# Patient Record
Sex: FEMALE | Race: WHITE | Employment: OTHER | URBAN - METROPOLITAN AREA
[De-identification: names, ages, dates, MRNs, and addresses within clinical notes are randomized per-mention and may not be internally consistent; named-entity substitution may affect disease eponyms.]

---

## 2017-01-01 ENCOUNTER — HOSPITAL ENCOUNTER (INPATIENT)
Facility: HOSPITAL | Age: 82
LOS: 3 days | Discharge: HOSPICE | DRG: 291 | End: 2017-01-01
Attending: EMERGENCY MEDICINE | Admitting: INTERNAL MEDICINE
Payer: MEDICARE

## 2017-01-01 ENCOUNTER — LAB REQUISITION (OUTPATIENT)
Dept: LAB | Facility: HOSPITAL | Age: 82
End: 2017-01-01
Payer: MEDICARE

## 2017-01-01 ENCOUNTER — HOSPITAL ENCOUNTER (INPATIENT)
Facility: HOSPITAL | Age: 82
LOS: 6 days | Discharge: NURSING, REHAB OR LONG-TERM CARE | DRG: 286 | End: 2017-01-01
Attending: EMERGENCY MEDICINE | Admitting: INTERNAL MEDICINE
Payer: MEDICARE

## 2017-01-01 ENCOUNTER — APPOINTMENT (OUTPATIENT)
Dept: CV DIAGNOSTICS | Facility: HOSPITAL | Age: 82
DRG: 286 | End: 2017-01-01
Attending: INTERNAL MEDICINE
Payer: MEDICARE

## 2017-01-01 ENCOUNTER — LAB REQUISITION (OUTPATIENT)
Dept: LAB | Facility: HOSPITAL | Age: 82
DRG: 291 | End: 2017-01-01
Payer: MEDICARE

## 2017-01-01 ENCOUNTER — APPOINTMENT (OUTPATIENT)
Dept: GENERAL RADIOLOGY | Facility: HOSPITAL | Age: 82
DRG: 291 | End: 2017-01-01
Attending: INTERNAL MEDICINE
Payer: MEDICARE

## 2017-01-01 ENCOUNTER — APPOINTMENT (OUTPATIENT)
Dept: INTERVENTIONAL RADIOLOGY/VASCULAR | Facility: HOSPITAL | Age: 82
DRG: 286 | End: 2017-01-01
Attending: NURSE PRACTITIONER
Payer: MEDICARE

## 2017-01-01 ENCOUNTER — APPOINTMENT (OUTPATIENT)
Dept: GENERAL RADIOLOGY | Facility: HOSPITAL | Age: 82
End: 2017-01-01
Attending: EMERGENCY MEDICINE
Payer: MEDICARE

## 2017-01-01 ENCOUNTER — OFFICE VISIT (OUTPATIENT)
Dept: CARDIOLOGY CLINIC | Facility: HOSPITAL | Age: 82
End: 2017-01-01
Attending: INTERNAL MEDICINE
Payer: MEDICARE

## 2017-01-01 ENCOUNTER — APPOINTMENT (OUTPATIENT)
Dept: ULTRASOUND IMAGING | Facility: HOSPITAL | Age: 82
DRG: 291 | End: 2017-01-01
Attending: INTERNAL MEDICINE
Payer: MEDICARE

## 2017-01-01 ENCOUNTER — APPOINTMENT (OUTPATIENT)
Dept: CT IMAGING | Facility: HOSPITAL | Age: 82
DRG: 286 | End: 2017-01-01
Attending: INTERNAL MEDICINE
Payer: MEDICARE

## 2017-01-01 ENCOUNTER — APPOINTMENT (OUTPATIENT)
Dept: GENERAL RADIOLOGY | Facility: HOSPITAL | Age: 82
DRG: 286 | End: 2017-01-01
Attending: EMERGENCY MEDICINE
Payer: MEDICARE

## 2017-01-01 ENCOUNTER — HOSPITAL ENCOUNTER (EMERGENCY)
Facility: HOSPITAL | Age: 82
Discharge: HOME | End: 2017-01-01
Attending: EMERGENCY MEDICINE
Payer: MEDICARE

## 2017-01-01 ENCOUNTER — APPOINTMENT (OUTPATIENT)
Dept: GENERAL RADIOLOGY | Facility: HOSPITAL | Age: 82
DRG: 291 | End: 2017-01-01
Attending: EMERGENCY MEDICINE
Payer: MEDICARE

## 2017-01-01 VITALS
WEIGHT: 86.19 LBS | RESPIRATION RATE: 16 BRPM | SYSTOLIC BLOOD PRESSURE: 102 MMHG | HEIGHT: 60 IN | DIASTOLIC BLOOD PRESSURE: 51 MMHG | TEMPERATURE: 98 F | OXYGEN SATURATION: 94 % | HEART RATE: 100 BPM | BODY MASS INDEX: 16.92 KG/M2

## 2017-01-01 VITALS
OXYGEN SATURATION: 97 % | HEIGHT: 64 IN | HEART RATE: 81 BPM | SYSTOLIC BLOOD PRESSURE: 99 MMHG | TEMPERATURE: 98 F | BODY MASS INDEX: 20.49 KG/M2 | DIASTOLIC BLOOD PRESSURE: 63 MMHG | RESPIRATION RATE: 18 BRPM | WEIGHT: 120 LBS

## 2017-01-01 VITALS
SYSTOLIC BLOOD PRESSURE: 125 MMHG | RESPIRATION RATE: 18 BRPM | OXYGEN SATURATION: 91 % | WEIGHT: 92 LBS | HEART RATE: 91 BPM | DIASTOLIC BLOOD PRESSURE: 72 MMHG | BODY MASS INDEX: 18 KG/M2

## 2017-01-01 VITALS
OXYGEN SATURATION: 98 % | BODY MASS INDEX: 21 KG/M2 | RESPIRATION RATE: 20 BRPM | WEIGHT: 107.63 LBS | SYSTOLIC BLOOD PRESSURE: 98 MMHG | TEMPERATURE: 97 F | HEART RATE: 98 BPM | DIASTOLIC BLOOD PRESSURE: 53 MMHG

## 2017-01-01 DIAGNOSIS — I50.9 HEART FAILURE (HCC): ICD-10-CM

## 2017-01-01 DIAGNOSIS — R79.89 OTHER SPECIFIED ABNORMAL FINDINGS OF BLOOD CHEMISTRY: ICD-10-CM

## 2017-01-01 DIAGNOSIS — I50.9 CONGESTIVE HEART FAILURE WITH CARDIOMYOPATHY AND CARDIOMEGALY (HCC): Primary | ICD-10-CM

## 2017-01-01 DIAGNOSIS — N39.0 URINARY TRACT INFECTION: ICD-10-CM

## 2017-01-01 DIAGNOSIS — I50.20 SYSTOLIC CONGESTIVE HEART FAILURE (HCC): ICD-10-CM

## 2017-01-01 DIAGNOSIS — I42.9 CONGESTIVE HEART FAILURE WITH CARDIOMYOPATHY AND CARDIOMEGALY (HCC): Primary | ICD-10-CM

## 2017-01-01 DIAGNOSIS — I35.0 NONRHEUMATIC AORTIC VALVE STENOSIS: ICD-10-CM

## 2017-01-01 DIAGNOSIS — I51.7 CONGESTIVE HEART FAILURE WITH CARDIOMYOPATHY AND CARDIOMEGALY (HCC): Primary | ICD-10-CM

## 2017-01-01 DIAGNOSIS — R60.9 EDEMA: ICD-10-CM

## 2017-01-01 DIAGNOSIS — S52.532A CLOSED COLLES' FRACTURE OF LEFT RADIUS, INITIAL ENCOUNTER: Primary | ICD-10-CM

## 2017-01-01 DIAGNOSIS — S52.615A CLOSED NONDISPLACED FRACTURE OF STYLOID PROCESS OF LEFT ULNA, INITIAL ENCOUNTER: ICD-10-CM

## 2017-01-01 DIAGNOSIS — I42.9 CARDIOMYOPATHY (HCC): ICD-10-CM

## 2017-01-01 DIAGNOSIS — I50.23 ACUTE ON CHRONIC SYSTOLIC (CONGESTIVE) HEART FAILURE (HCC): Primary | ICD-10-CM

## 2017-01-01 DIAGNOSIS — E88.9 METABOLIC DISORDER: ICD-10-CM

## 2017-01-01 DIAGNOSIS — I50.9 ACUTE ON CHRONIC CONGESTIVE HEART FAILURE, UNSPECIFIED CONGESTIVE HEART FAILURE TYPE: Primary | ICD-10-CM

## 2017-01-01 LAB
ANION GAP SERPL CALC-SCNC: 10 MMOL/L (ref 0–18)
ANION GAP SERPL CALC-SCNC: 10 MMOL/L (ref 0–18)
ANION GAP SERPL CALC-SCNC: 11 MMOL/L (ref 0–18)
ANION GAP SERPL CALC-SCNC: 11 MMOL/L (ref 0–18)
ANION GAP SERPL CALC-SCNC: 12 MMOL/L (ref 0–18)
ANION GAP SERPL CALC-SCNC: 7 MMOL/L (ref 0–18)
ANION GAP SERPL CALC-SCNC: 7 MMOL/L (ref 0–18)
ANION GAP SERPL CALC-SCNC: 8 MMOL/L (ref 0–18)
ANION GAP SERPL CALC-SCNC: 9 MMOL/L (ref 0–18)
ANION GAP SERPL CALC-SCNC: 9 MMOL/L (ref 0–18)
BASOPHILS # BLD: 0 K/UL (ref 0–0.2)
BASOPHILS # BLD: 0.1 K/UL (ref 0–0.2)
BASOPHILS NFR BLD: 1 %
BASOPHILS NFR BLD: 1 %
BNP SERPL-MCNC: 4951 PG/ML (ref 0–100)
BNP SERPL-MCNC: 5388 PG/ML (ref 0–100)
BUN SERPL-MCNC: 20 MG/DL (ref 8–20)
BUN SERPL-MCNC: 24 MG/DL (ref 8–20)
BUN SERPL-MCNC: 25 MG/DL (ref 8–20)
BUN SERPL-MCNC: 27 MG/DL (ref 8–20)
BUN SERPL-MCNC: 28 MG/DL (ref 8–20)
BUN SERPL-MCNC: 29 MG/DL (ref 8–20)
BUN SERPL-MCNC: 31 MG/DL (ref 8–20)
BUN SERPL-MCNC: 33 MG/DL (ref 8–20)
BUN SERPL-MCNC: 38 MG/DL (ref 8–20)
BUN SERPL-MCNC: 41 MG/DL (ref 8–20)
BUN/CREAT SERPL: 22 (ref 10–20)
BUN/CREAT SERPL: 25.8 (ref 10–20)
BUN/CREAT SERPL: 27 (ref 10–20)
BUN/CREAT SERPL: 27 (ref 10–20)
BUN/CREAT SERPL: 30.8 (ref 10–20)
BUN/CREAT SERPL: 32.5 (ref 10–20)
BUN/CREAT SERPL: 32.6 (ref 10–20)
BUN/CREAT SERPL: 34.8 (ref 10–20)
BUN/CREAT SERPL: 40.4 (ref 10–20)
BUN/CREAT SERPL: 41 (ref 10–20)
CALCIUM SERPL-MCNC: 8.4 MG/DL (ref 8.5–10.5)
CALCIUM SERPL-MCNC: 8.5 MG/DL (ref 8.5–10.5)
CALCIUM SERPL-MCNC: 8.5 MG/DL (ref 8.5–10.5)
CALCIUM SERPL-MCNC: 8.6 MG/DL (ref 8.5–10.5)
CALCIUM SERPL-MCNC: 8.9 MG/DL (ref 8.5–10.5)
CALCIUM SERPL-MCNC: 9 MG/DL (ref 8.5–10.5)
CALCIUM SERPL-MCNC: 9.1 MG/DL (ref 8.5–10.5)
CALCIUM SERPL-MCNC: 9.1 MG/DL (ref 8.5–10.5)
CALCIUM SERPL-MCNC: 9.2 MG/DL (ref 8.5–10.5)
CALCIUM SERPL-MCNC: 9.2 MG/DL (ref 8.5–10.5)
CHLORIDE SERPL-SCNC: 101 MMOL/L (ref 95–110)
CHLORIDE SERPL-SCNC: 101 MMOL/L (ref 95–110)
CHLORIDE SERPL-SCNC: 102 MMOL/L (ref 95–110)
CHLORIDE SERPL-SCNC: 102 MMOL/L (ref 95–110)
CHLORIDE SERPL-SCNC: 103 MMOL/L (ref 95–110)
CHLORIDE SERPL-SCNC: 105 MMOL/L (ref 95–110)
CHLORIDE SERPL-SCNC: 105 MMOL/L (ref 95–110)
CHLORIDE SERPL-SCNC: 94 MMOL/L (ref 95–110)
CHLORIDE SERPL-SCNC: 97 MMOL/L (ref 95–110)
CHLORIDE SERPL-SCNC: 99 MMOL/L (ref 95–110)
CHOLEST SERPL-MCNC: 177 MG/DL (ref 110–200)
CO2 SERPL-SCNC: 24 MMOL/L (ref 22–32)
CO2 SERPL-SCNC: 28 MMOL/L (ref 22–32)
CO2 SERPL-SCNC: 29 MMOL/L (ref 22–32)
CO2 SERPL-SCNC: 31 MMOL/L (ref 22–32)
CO2 SERPL-SCNC: 31 MMOL/L (ref 22–32)
CO2 SERPL-SCNC: 32 MMOL/L (ref 22–32)
CO2 SERPL-SCNC: 33 MMOL/L (ref 22–32)
CO2 SERPL-SCNC: 39 MMOL/L (ref 22–32)
CREAT SERPL-MCNC: 0.74 MG/DL (ref 0.5–1.5)
CREAT SERPL-MCNC: 0.78 MG/DL (ref 0.5–1.5)
CREAT SERPL-MCNC: 0.83 MG/DL (ref 0.5–1.5)
CREAT SERPL-MCNC: 0.89 MG/DL (ref 0.5–1.5)
CREAT SERPL-MCNC: 0.89 MG/DL (ref 0.5–1.5)
CREAT SERPL-MCNC: 0.94 MG/DL (ref 0.5–1.5)
CREAT SERPL-MCNC: 0.97 MG/DL (ref 0.5–1.5)
CREAT SERPL-MCNC: 1 MG/DL (ref 0.5–1.5)
CREAT SERPL-MCNC: 1.22 MG/DL (ref 0.5–1.5)
CREAT SERPL-MCNC: 1.27 MG/DL (ref 0.5–1.5)
EOSINOPHIL # BLD: 0.3 K/UL (ref 0–0.7)
EOSINOPHIL # BLD: 0.5 K/UL (ref 0–0.7)
EOSINOPHIL NFR BLD: 4 %
EOSINOPHIL NFR BLD: 6 %
ERYTHROCYTE [DISTWIDTH] IN BLOOD BY AUTOMATED COUNT: 14.3 % (ref 11–15)
ERYTHROCYTE [DISTWIDTH] IN BLOOD BY AUTOMATED COUNT: 16.8 % (ref 11–15)
GLUCOSE SERPL-MCNC: 104 MG/DL (ref 70–99)
GLUCOSE SERPL-MCNC: 107 MG/DL (ref 70–99)
GLUCOSE SERPL-MCNC: 111 MG/DL (ref 70–99)
GLUCOSE SERPL-MCNC: 111 MG/DL (ref 70–99)
GLUCOSE SERPL-MCNC: 114 MG/DL (ref 70–99)
GLUCOSE SERPL-MCNC: 123 MG/DL (ref 70–99)
GLUCOSE SERPL-MCNC: 125 MG/DL (ref 70–99)
GLUCOSE SERPL-MCNC: 133 MG/DL (ref 70–99)
GLUCOSE SERPL-MCNC: 139 MG/DL (ref 70–99)
GLUCOSE SERPL-MCNC: 98 MG/DL (ref 70–99)
HCT VFR BLD AUTO: 36.5 % (ref 35–48)
HCT VFR BLD AUTO: 42.8 % (ref 35–48)
HDLC SERPL-MCNC: 46 MG/DL
HGB BLD-MCNC: 12.2 G/DL (ref 12–16)
HGB BLD-MCNC: 13.9 G/DL (ref 12–16)
LDLC SERPL CALC-MCNC: 109 MG/DL (ref 0–99)
LYMPHOCYTES # BLD: 1.3 K/UL (ref 1–4)
LYMPHOCYTES # BLD: 1.5 K/UL (ref 1–4)
LYMPHOCYTES NFR BLD: 16 %
LYMPHOCYTES NFR BLD: 20 %
MCH RBC QN AUTO: 28.9 PG (ref 27–32)
MCH RBC QN AUTO: 29.5 PG (ref 27–32)
MCHC RBC AUTO-ENTMCNC: 32.3 G/DL (ref 32–37)
MCHC RBC AUTO-ENTMCNC: 33.5 G/DL (ref 32–37)
MCV RBC AUTO: 88 FL (ref 80–100)
MCV RBC AUTO: 89.3 FL (ref 80–100)
MONOCYTES # BLD: 0.6 K/UL (ref 0–1)
MONOCYTES # BLD: 0.6 K/UL (ref 0–1)
MONOCYTES NFR BLD: 7 %
MONOCYTES NFR BLD: 8 %
NEUTROPHILS # BLD AUTO: 5.2 K/UL (ref 1.8–7.7)
NEUTROPHILS # BLD AUTO: 6 K/UL (ref 1.8–7.7)
NEUTROPHILS NFR BLD: 66 %
NEUTROPHILS NFR BLD: 73 %
NONHDLC SERPL-MCNC: 131 MG/DL
OSMOLALITY UR CALC.SUM OF ELEC: 290 MOSM/KG (ref 275–295)
OSMOLALITY UR CALC.SUM OF ELEC: 291 MOSM/KG (ref 275–295)
OSMOLALITY UR CALC.SUM OF ELEC: 294 MOSM/KG (ref 275–295)
OSMOLALITY UR CALC.SUM OF ELEC: 296 MOSM/KG (ref 275–295)
OSMOLALITY UR CALC.SUM OF ELEC: 298 MOSM/KG (ref 275–295)
OSMOLALITY UR CALC.SUM OF ELEC: 311 MOSM/KG (ref 275–295)
OSMOLALITY UR CALC.SUM OF ELEC: 312 MOSM/KG (ref 275–295)
OSMOLALITY UR CALC.SUM OF ELEC: 313 MOSM/KG (ref 275–295)
PLATELET # BLD AUTO: 179 K/UL (ref 140–400)
PLATELET # BLD AUTO: 269 K/UL (ref 140–400)
PMV BLD AUTO: 8.4 FL (ref 7.4–10.3)
PMV BLD AUTO: 9.1 FL (ref 7.4–10.3)
POTASSIUM SERPL-SCNC: 3.1 MMOL/L (ref 3.3–5.1)
POTASSIUM SERPL-SCNC: 3.2 MMOL/L (ref 3.3–5.1)
POTASSIUM SERPL-SCNC: 3.5 MMOL/L (ref 3.3–5.1)
POTASSIUM SERPL-SCNC: 4 MMOL/L (ref 3.3–5.1)
POTASSIUM SERPL-SCNC: 4.1 MMOL/L (ref 3.3–5.1)
POTASSIUM SERPL-SCNC: 4.1 MMOL/L (ref 3.3–5.1)
POTASSIUM SERPL-SCNC: 4.2 MMOL/L (ref 3.3–5.1)
POTASSIUM SERPL-SCNC: 4.9 MMOL/L (ref 3.3–5.1)
RBC # BLD AUTO: 4.14 M/UL (ref 3.7–5.4)
RBC # BLD AUTO: 4.8 M/UL (ref 3.7–5.4)
SODIUM SERPL-SCNC: 135 MMOL/L (ref 136–144)
SODIUM SERPL-SCNC: 137 MMOL/L (ref 136–144)
SODIUM SERPL-SCNC: 138 MMOL/L (ref 136–144)
SODIUM SERPL-SCNC: 138 MMOL/L (ref 136–144)
SODIUM SERPL-SCNC: 139 MMOL/L (ref 136–144)
SODIUM SERPL-SCNC: 139 MMOL/L (ref 136–144)
SODIUM SERPL-SCNC: 141 MMOL/L (ref 136–144)
SODIUM SERPL-SCNC: 146 MMOL/L (ref 136–144)
SODIUM SERPL-SCNC: 147 MMOL/L (ref 136–144)
SODIUM SERPL-SCNC: 148 MMOL/L (ref 136–144)
TRIGL SERPL-MCNC: 111 MG/DL (ref 1–149)
TROPONIN I SERPL-MCNC: 0.05 NG/ML (ref ?–0.03)
TROPONIN I SERPL-MCNC: 0.06 NG/ML (ref ?–0.03)
TSH SERPL-ACNC: 3.29 UIU/ML (ref 0.34–5.6)
WBC # BLD AUTO: 7.9 K/UL (ref 4–11)
WBC # BLD AUTO: 8.3 K/UL (ref 4–11)

## 2017-01-01 PROCEDURE — 76770 US EXAM ABDO BACK WALL COMP: CPT

## 2017-01-01 PROCEDURE — 80048 BASIC METABOLIC PNL TOTAL CA: CPT | Performed by: INTERNAL MEDICINE

## 2017-01-01 PROCEDURE — 85025 COMPLETE CBC W/AUTO DIFF WBC: CPT | Performed by: INTERNAL MEDICINE

## 2017-01-01 PROCEDURE — 99232 SBSQ HOSP IP/OBS MODERATE 35: CPT | Performed by: INTERNAL MEDICINE

## 2017-01-01 PROCEDURE — 70450 CT HEAD/BRAIN W/O DYE: CPT

## 2017-01-01 PROCEDURE — 80053 COMPREHEN METABOLIC PANEL: CPT | Performed by: INTERNAL MEDICINE

## 2017-01-01 PROCEDURE — 87077 CULTURE AEROBIC IDENTIFY: CPT

## 2017-01-01 PROCEDURE — 99284 EMERGENCY DEPT VISIT MOD MDM: CPT

## 2017-01-01 PROCEDURE — B2111ZZ FLUOROSCOPY OF MULTIPLE CORONARY ARTERIES USING LOW OSMOLAR CONTRAST: ICD-10-PCS | Performed by: INTERNAL MEDICINE

## 2017-01-01 PROCEDURE — 71010 XR CHEST AP PORTABLE  (CPT=71010): CPT

## 2017-01-01 PROCEDURE — 4A023N6 MEASUREMENT OF CARDIAC SAMPLING AND PRESSURE, RIGHT HEART, PERCUTANEOUS APPROACH: ICD-10-PCS | Performed by: INTERNAL MEDICINE

## 2017-01-01 PROCEDURE — 87086 URINE CULTURE/COLONY COUNT: CPT

## 2017-01-01 PROCEDURE — 99214 OFFICE O/P EST MOD 30 MIN: CPT | Performed by: CLINICAL NURSE SPECIALIST

## 2017-01-01 PROCEDURE — 87186 SC STD MICRODIL/AGAR DIL: CPT

## 2017-01-01 PROCEDURE — 93306 TTE W/DOPPLER COMPLETE: CPT

## 2017-01-01 PROCEDURE — 81001 URINALYSIS AUTO W/SCOPE: CPT | Performed by: INTERNAL MEDICINE

## 2017-01-01 PROCEDURE — 29125 APPL SHORT ARM SPLINT STATIC: CPT

## 2017-01-01 PROCEDURE — 72170 X-RAY EXAM OF PELVIS: CPT

## 2017-01-01 PROCEDURE — 36415 COLL VENOUS BLD VENIPUNCTURE: CPT | Performed by: CLINICAL NURSE SPECIALIST

## 2017-01-01 PROCEDURE — 71020 XR CHEST PA + LAT CHEST (CPT=71020): CPT

## 2017-01-01 PROCEDURE — 73552 X-RAY EXAM OF FEMUR 2/>: CPT

## 2017-01-01 PROCEDURE — 99223 1ST HOSP IP/OBS HIGH 75: CPT | Performed by: REGISTERED NURSE

## 2017-01-01 PROCEDURE — 87086 URINE CULTURE/COLONY COUNT: CPT | Performed by: INTERNAL MEDICINE

## 2017-01-01 PROCEDURE — 83880 ASSAY OF NATRIURETIC PEPTIDE: CPT | Performed by: INTERNAL MEDICINE

## 2017-01-01 PROCEDURE — 99211 OFF/OP EST MAY X REQ PHY/QHP: CPT | Performed by: CLINICAL NURSE SPECIALIST

## 2017-01-01 PROCEDURE — 73110 X-RAY EXAM OF WRIST: CPT

## 2017-01-01 PROCEDURE — 93306 TTE W/DOPPLER COMPLETE: CPT | Performed by: INTERNAL MEDICINE

## 2017-01-01 RX ORDER — DOCUSATE SODIUM 100 MG/1
100 CAPSULE, LIQUID FILLED ORAL 2 TIMES DAILY PRN
COMMUNITY
End: 2017-01-01

## 2017-01-01 RX ORDER — FUROSEMIDE 10 MG/ML
40 INJECTION INTRAMUSCULAR; INTRAVENOUS ONCE
Status: COMPLETED | OUTPATIENT
Start: 2017-01-01 | End: 2017-01-01

## 2017-01-01 RX ORDER — FUROSEMIDE 10 MG/ML
40 INJECTION INTRAMUSCULAR; INTRAVENOUS EVERY 8 HOURS
Status: DISCONTINUED | OUTPATIENT
Start: 2017-01-01 | End: 2017-01-01

## 2017-01-01 RX ORDER — FUROSEMIDE 10 MG/ML
20 INJECTION INTRAMUSCULAR; INTRAVENOUS
Status: DISCONTINUED | OUTPATIENT
Start: 2017-01-01 | End: 2017-01-01

## 2017-01-01 RX ORDER — DOCUSATE SODIUM 100 MG/1
100 CAPSULE, LIQUID FILLED ORAL DAILY
Status: DISCONTINUED | OUTPATIENT
Start: 2017-01-01 | End: 2017-01-01

## 2017-01-01 RX ORDER — ONDANSETRON 4 MG/1
4 TABLET, ORALLY DISINTEGRATING ORAL EVERY 6 HOURS PRN
Status: DISCONTINUED | OUTPATIENT
Start: 2017-01-01 | End: 2017-01-01

## 2017-01-01 RX ORDER — ONDANSETRON 4 MG/1
4 TABLET, ORALLY DISINTEGRATING ORAL EVERY 6 HOURS PRN
COMMUNITY

## 2017-01-01 RX ORDER — ALBUMIN (HUMAN) 12.5 G/50ML
12.5 SOLUTION INTRAVENOUS ONCE
Status: DISCONTINUED | OUTPATIENT
Start: 2017-01-01 | End: 2017-01-01

## 2017-01-01 RX ORDER — SODIUM CHLORIDE 9 MG/ML
INJECTION, SOLUTION INTRAVENOUS CONTINUOUS
Status: DISCONTINUED | OUTPATIENT
Start: 2017-01-01 | End: 2017-01-01

## 2017-01-01 RX ORDER — ATENOLOL 25 MG/1
25 TABLET ORAL
Status: DISCONTINUED | OUTPATIENT
Start: 2017-01-01 | End: 2017-01-01

## 2017-01-01 RX ORDER — METOPROLOL SUCCINATE 25 MG/1
12.5 TABLET, EXTENDED RELEASE ORAL
Status: DISCONTINUED | OUTPATIENT
Start: 2017-01-01 | End: 2017-01-01

## 2017-01-01 RX ORDER — FUROSEMIDE 10 MG/ML
40 INJECTION INTRAMUSCULAR; INTRAVENOUS DAILY
Status: DISCONTINUED | OUTPATIENT
Start: 2017-01-01 | End: 2017-01-01

## 2017-01-01 RX ORDER — FUROSEMIDE 20 MG/1
20 TABLET ORAL
Qty: 30 TABLET | Refills: 3 | Status: SHIPPED | OUTPATIENT
Start: 2017-01-01 | End: 2017-01-01

## 2017-01-01 RX ORDER — SODIUM POLYSTYRENE SULFONATE 15 G/60ML
15 SUSPENSION ORAL; RECTAL ONCE
Status: COMPLETED | OUTPATIENT
Start: 2017-01-01 | End: 2017-01-01

## 2017-01-01 RX ORDER — LISINOPRIL 5 MG/1
5 TABLET ORAL DAILY
Status: DISCONTINUED | OUTPATIENT
Start: 2017-01-01 | End: 2017-01-01

## 2017-01-01 RX ORDER — FUROSEMIDE 10 MG/ML
20 INJECTION INTRAMUSCULAR; INTRAVENOUS ONCE
Status: COMPLETED | OUTPATIENT
Start: 2017-01-01 | End: 2017-01-01

## 2017-01-01 RX ORDER — 0.9 % SODIUM CHLORIDE 0.9 %
VIAL (ML) INJECTION
Status: COMPLETED
Start: 2017-01-01 | End: 2017-01-01

## 2017-01-01 RX ORDER — CEPHALEXIN 250 MG/1
250 CAPSULE ORAL 2 TIMES DAILY
Status: DISCONTINUED | OUTPATIENT
Start: 2017-01-01 | End: 2017-01-01

## 2017-01-01 RX ORDER — CASTOR OIL AND BALSAM, PERU 788; 87 MG/G; MG/G
OINTMENT TOPICAL 2 TIMES DAILY
Status: DISCONTINUED | OUTPATIENT
Start: 2017-01-01 | End: 2017-01-01

## 2017-01-01 RX ORDER — ASPIRIN 81 MG/1
81 TABLET ORAL DAILY
Status: DISCONTINUED | OUTPATIENT
Start: 2017-01-01 | End: 2017-01-01

## 2017-01-01 RX ORDER — FUROSEMIDE 10 MG/ML
40 INJECTION INTRAMUSCULAR; INTRAVENOUS 3 TIMES DAILY
Status: DISCONTINUED | OUTPATIENT
Start: 2017-01-01 | End: 2017-01-01

## 2017-01-01 RX ORDER — FUROSEMIDE 20 MG/1
20 TABLET ORAL DAILY
Status: COMPLETED | OUTPATIENT
Start: 2017-01-01 | End: 2017-01-01

## 2017-01-01 RX ORDER — ALENDRONATE SODIUM 70 MG/1
70 TABLET ORAL WEEKLY
Status: DISCONTINUED | OUTPATIENT
Start: 2017-01-01 | End: 2017-01-01

## 2017-01-01 RX ORDER — ALPRAZOLAM 0.25 MG/1
0.25 TABLET ORAL EVERY 12 HOURS PRN
Status: DISCONTINUED | OUTPATIENT
Start: 2017-01-01 | End: 2017-01-01

## 2017-01-01 RX ORDER — ACETAMINOPHEN 325 MG/1
650 TABLET ORAL EVERY 6 HOURS PRN
Qty: 30 TABLET | Refills: 0 | Status: SHIPPED | OUTPATIENT
Start: 2017-01-01

## 2017-01-01 RX ORDER — MELATONIN
325
Qty: 30 TABLET | Refills: 0 | Status: SHIPPED | OUTPATIENT
Start: 2017-01-01

## 2017-01-01 RX ORDER — ACETAMINOPHEN 325 MG/1
650 TABLET ORAL EVERY 6 HOURS PRN
Status: DISCONTINUED | OUTPATIENT
Start: 2017-01-01 | End: 2017-01-01

## 2017-01-01 RX ORDER — FUROSEMIDE 10 MG/ML
80 INJECTION INTRAMUSCULAR; INTRAVENOUS
Status: DISCONTINUED | OUTPATIENT
Start: 2017-01-01 | End: 2017-01-01

## 2017-01-01 RX ORDER — CEPHALEXIN 250 MG/1
250 CAPSULE ORAL 2 TIMES DAILY
COMMUNITY
Start: 2017-01-01

## 2017-01-01 RX ORDER — FUROSEMIDE 10 MG/ML
40 INJECTION INTRAMUSCULAR; INTRAVENOUS
Status: DISCONTINUED | OUTPATIENT
Start: 2017-01-01 | End: 2017-01-01

## 2017-01-01 RX ORDER — ALBUMIN (HUMAN) 12.5 G/50ML
25 SOLUTION INTRAVENOUS ONCE
Status: COMPLETED | OUTPATIENT
Start: 2017-01-01 | End: 2017-01-01

## 2017-01-01 RX ORDER — FUROSEMIDE 20 MG/1
20 TABLET ORAL
Status: DISCONTINUED | OUTPATIENT
Start: 2017-01-01 | End: 2017-01-01

## 2017-01-01 RX ORDER — SODIUM CHLORIDE 0.9 % (FLUSH) 0.9 %
2 SYRINGE (ML) INJECTION AS NEEDED
Status: DISCONTINUED | OUTPATIENT
Start: 2017-01-01 | End: 2017-01-01

## 2017-01-01 RX ORDER — CHLORHEXIDINE GLUCONATE 4 G/100ML
30 SOLUTION TOPICAL
Status: ACTIVE | OUTPATIENT
Start: 2017-01-01 | End: 2017-01-01

## 2017-01-01 RX ORDER — LIDOCAINE HYDROCHLORIDE 20 MG/ML
INJECTION, SOLUTION EPIDURAL; INFILTRATION; INTRACAUDAL; PERINEURAL
Status: COMPLETED
Start: 2017-01-01 | End: 2017-01-01

## 2017-01-01 RX ORDER — LISINOPRIL 10 MG/1
10 TABLET ORAL DAILY
Status: DISCONTINUED | OUTPATIENT
Start: 2017-01-01 | End: 2017-01-01

## 2017-01-01 RX ORDER — ATENOLOL 50 MG/1
50 TABLET ORAL
Status: DISCONTINUED | OUTPATIENT
Start: 2017-01-01 | End: 2017-01-01

## 2017-01-01 RX ORDER — POTASSIUM CHLORIDE 1.5 G/1.77G
20 POWDER, FOR SOLUTION ORAL DAILY
Status: DISCONTINUED | OUTPATIENT
Start: 2017-01-01 | End: 2017-01-01

## 2017-01-01 RX ORDER — ASPIRIN 81 MG/1
324 TABLET, CHEWABLE ORAL ONCE
Status: DISCONTINUED | OUTPATIENT
Start: 2017-01-01 | End: 2017-01-01

## 2017-01-01 RX ORDER — DIPHENHYDRAMINE HYDROCHLORIDE 50 MG/ML
INJECTION INTRAMUSCULAR; INTRAVENOUS
Status: COMPLETED
Start: 2017-01-01 | End: 2017-01-01

## 2017-01-01 RX ORDER — METOPROLOL SUCCINATE 25 MG/1
12.5 TABLET, EXTENDED RELEASE ORAL
Qty: 30 TABLET | Refills: 3 | Status: SHIPPED | OUTPATIENT
Start: 2017-01-01

## 2017-01-01 RX ORDER — POLYETHYLENE GLYCOL 3350 17 G/17G
17 POWDER, FOR SOLUTION ORAL DAILY
Status: DISCONTINUED | OUTPATIENT
Start: 2017-01-01 | End: 2017-01-01

## 2017-01-11 PROBLEM — I42.9 CONGESTIVE HEART FAILURE WITH CARDIOMYOPATHY AND CARDIOMEGALY (HCC): Status: ACTIVE | Noted: 2017-01-01

## 2017-01-11 PROBLEM — I51.7 CONGESTIVE HEART FAILURE WITH CARDIOMYOPATHY AND CARDIOMEGALY (HCC): Status: ACTIVE | Noted: 2017-01-01

## 2017-01-11 PROBLEM — I50.9 CONGESTIVE HEART FAILURE WITH CARDIOMYOPATHY AND CARDIOMEGALY (HCC): Status: ACTIVE | Noted: 2017-01-01

## 2017-01-12 NOTE — CONSULTS
leighs amg cardiology consult: full note dictated    81 y/o wf with htn, arthritis, hyperlipidemia, and aortic stenosis who presents with perez occuring over the past few weeks. She denies cp. She has had no palpitations. Ekg: sinus. bnp elevated.  Cxr: RICO Garcia

## 2017-01-12 NOTE — CONSULTS
[facility]    PATIENT'S NAME: Ryan Kolb   ATTENDING PHYSICIAN: Per Chan MD   CONSULTING PHYSICIAN: Hola Calderon.  Citlali Vance MD   PATIENT ACCOUNT#:   [id number]    LOCATION:  [address] [place] [address] #:   [id number]       DATE OF BIRTH: Bowel sounds are normal.  Abdomen is nontender. EXTREMITIES:  Very minimal pitting edema. There are no cords palpable. SKIN:  No rash. NEUROLOGIC:  Alert and oriented with normal affect. LABORATORY DATA:  BUN is 41 and creatinine 1.0.   Sodium is 135

## 2017-01-12 NOTE — ED PROVIDER NOTES
Patient Seen in: [facility] 3w/sw    History   Patient presents with:  Dyspnea STEPHAN SOB (respiratory)  Fatigue (constitutional, neurologic)    Stated Complaint: Congestive heart failure with cardiomyopathy and cardiomegaly ([address]    HPI    Patient i for stated complaint: Congestive heart failure with cardiomyopathy and cardiomegaly ([facility] [address]  Other systems are as noted in HPI. Constitutional and vital signs reviewed. All other systems reviewed and negative except as noted above.     PSFH elements rev components within normal limits   TROPONIN I, 2 HOUR - Abnormal; Notable for the following:     Troponin 0.05 (*)     All other components within normal limits   BNP (B TYPE NATRIUERTIC PEPTIDE) - Abnormal; Notable for the following:     Beta Natriuretic P

## 2017-01-12 NOTE — CM/SW NOTE
Met with patient and patient's daughter at bedside. Patient's daughter states the patient lives at home alone in a 2 story home. Patient's daughter states she and her brother take turns visiting the the patient at her home.  Patient's daughter states the pa

## 2017-01-12 NOTE — PLAN OF CARE
Patient Centered Care    • Patient preferences are identified and integrated in the patient's plan of care Progressing        Patient/Family Goals    • Patient/Family Long Term Goal Progressing    • Patient/Family Short Term Goal Progressing          Echo

## 2017-01-12 NOTE — H&P
[facility]    History and Physical    Fernie Boston Patient Status:  Inpatient    10/6/1926 MRN [id number]   Location [facility] 3W/SW Attending Michela Mckoy MD   Hosp Day # 1 PCP Yoselyn Mejia MD     Date:  2017  Ricardo Jeff Positive for shortness of breath. Negative for chest tightness and wheezing. Cardiovascular: Negative for chest pain, palpitations and leg swelling. Gastrointestinal: Negative for abdominal pain and abdominal distention.    Neurological: Negative for s TROP 0.05* 01/11/2017     Xr Chest Pa + Lat Chest (cpt=71020)    1/11/2017  CONCLUSION:  1. CHF with pulmonary interstitial edema and bibasilar pleural effusions. 2. Chronic L1-T10 and T7 vertebral body compression fractures.  3. Healed left proximal hume

## 2017-01-12 NOTE — HISTORICAL OFFICE NOTE
Shawnee Langford  : 10/06/1926  MRNO:  [id number]  [id number]  PCP: Dr. Joel Dove     TODAY'S DATE: 2009     CHIEF COMPLAINT: Followup of Aortic stenosis/insufficiency, Followup of Esophageal Reflux, Followup of Hypercholesterolemia, Followup displaced, no lifts and thrills or rub. AUSC:  regular rhythm, normal S1, S2 and without S3; 2/6 crescendo-decrescendo murmur. CAROTIDS: carotid pulses normal. ABD AORTA: abdominal aorta not enlarged. FEM: femoral pulses intact. PEDAL: pedal pulses intact. rest and 150/70 mmHg during the stress procedure. The patient did not develop significant symptoms.   The resting electrocardiogram demonstrated normal sinus rhythm and nonspecific ST T wave abnormality and did not show ST-segment changes with pharmacologi function. 3. Normal right atrial size. 4. Mild left atrial enlargement. 5. A thickened trileaflet aortic valve with decreased leaflet excursion. Mean Doppler gradient is 25 mmHg consistent with moderate aortic stenosis.   Mild aortic insufficiency is de

## 2017-01-13 NOTE — CDS QUERY
Potential for Impaired Nutritional Status  DOCUMENTATION CLARIFICATION FORM  Dear Doctor: Loni Ward information suggests potential for impaired nutritional status.  For accurate ICD-10-CM code assignment to reflect severity of illness and risk of mort

## 2017-01-13 NOTE — H&P
[facility]    Progress Note    Pattie Hurt Patient Status:  Inpatient    10/6/1926 MRN [id number]   Location [facility] 3W/SW Attending Leandro Garza MD   Hosp Day # 2 PCP Cheyenne Alexander MD         Assessment and Plan:  01/13/2017   CO2 29 01/13/2017   * 01/13/2017   CA 8.5 01/13/2017   ALB 4.1 01/18/2016   BILT 0.4 01/18/2016   TP 7.3 01/18/2016   AST 19 01/18/2016   ALT 14 01/18/2016   T4F 0.75 03/27/2013   TSH 3.29 01/13/2017   CRP 0.7 02/16/2012   TROP

## 2017-01-13 NOTE — PROGRESS NOTES
[facility]    Progress Note    Farooq Lombardi Patient Status:  Inpatient    10/6/1926 MRN [id number]   Location [facility] 3W/SW Attending Alex Caldwell MD   Hosp Day # 2 PCP Katie Grullon MD     Subjective:     Constituti 01/11/2017    01/11/2017   CO2 24 01/11/2017   * 01/11/2017   CA 9.1 01/11/2017   ALB 4.1 01/18/2016   BILT 0.4 01/18/2016   TP 7.3 01/18/2016   AST 19 01/18/2016   ALT 14 01/18/2016   T4F 0.75 03/27/2013   TSH 3.76 01/18/2016   CRP 0.7 02/16/

## 2017-01-13 NOTE — DIETARY NOTE
ADULT NUTRITION INITIAL ASSESSMENT    Pt is at moderate nutrition risk. Pt meets malnutrition criteria.       CRITERIA FOR MALNUTRITION DIAGNOSIS:  Criteria for severe malnutrition diagnosis: chronic illness related to body fat severe depletion, related to 01/12/17 0258 40.37 kg (89 lb)   01/11/17 1745 38.556 kg (85 lb)     GASTROINTESTINAL PROBLEMS: GI intact    FOOD/NUTRITION RELATED HISTORY:  Appetite: Good  Intake: %    Intake Meeting Needs: Yes    Food Allergies: No  Cultural/Ethnic/Sikhism Pr

## 2017-01-13 NOTE — PHYSICAL THERAPY NOTE
PHYSICAL THERAPY EVALUATION - INPATIENT     Room Number: 328/328-A  Evaluation Date: 1/13/2017  Type of Evaluation: Initial  Physician Order: PT Eval and Treat    Presenting Problem: SOB  fatigue  CHF exacerbation  declining safety and mobility in the due to cogn status and some dementia noted. Per pt lives alone in home   Family(son and daughter in the area) and they check on her. Pt reports  passed away approx 6 months ago.   Pt  reports indep household ambulation without AD    indep ADLS Resting /63 HR 71 O2 sats 96 %   After activity   BP  122/77  HR 75  O2 sats 93 %   All activity on RA     Pt denies any symptoms with activity     AM-PAC '6-Clicks' INPATIENT SHORT FORM - BASIC MOBILITY  How much difficulty does the patient curren met;Call light within reach;RN aware of session/findings; All patient questions and concerns addressed (chair alarm pad placed  RN informed need hard box for alarm )    ASSESSMENT   pt is a 80 female admitted from ER on 1/11/17 with SOB  CHF exacerbation fa assist device:LRAD  at assistance level: supervision     Goal #4 If to home with 24hr assist of family   Assess stairs up down  4 steps with rail   CGA    Goal #5    Goal #6    Goal Comments: Goals established on 1/13/2017

## 2017-01-13 NOTE — DISCHARGE PLANNING
1/13CM- MD orders received in regards to discharge planning. The Patient was seen at bedside. The Patient resides with her son Carol Boston  in [place] in a single family home, .  Prior to hospitalization, the patient wasn't driving,  doing grocery shopping, Betsy Pereyra

## 2017-01-13 NOTE — SPIRITUAL CARE NOTE
Chp provided pastoral support through empathetic listening. Pt shared about the loss of her , her parents, and family. Pt appreciates regular pastoral visits.

## 2017-01-14 NOTE — PLAN OF CARE
Problem: SAFETY ADULT - FALL  Goal: Free from fall injury  INTERVENTIONS:  - Assess pt frequently for physical needs  - Identify cognitive and physical deficits and behaviors that affect risk of falls.   - [place] fall precautions as indicated by assessme

## 2017-01-14 NOTE — PROGRESS NOTES
[facility]    Progress Note    Lindsey Cunningham Patient Status:  Inpatient    10/6/1926 MRN [id number]   Location [facility] 3W/SW Attending Cassia Harrison MD   Hosp Day # 3 PCP Baker Dance, MD     Subjective:waking in the r Results:     Lab Results  Component Value Date   WBC 7.9 01/11/2017   HGB 12.2 01/11/2017   HCT 36.5 01/11/2017    01/11/2017   CREATSERUM 0.83 01/14/2017   BUN 27* 01/14/2017    01/14/2017   K 4.1 01/14/2017    01/14/2017   CO2 29 01/

## 2017-01-14 NOTE — PROGRESS NOTES
[facility] [facility]    Progress Note    Fred Ruiz Patient Status:  Inpatient    10/6/1926 MRN [id number]   Location [facility] 3W/SW Attending Dewey Baker MD   Hosp Day # 3 PCP Prosper Atkins MD         Assessment and Plan:   C 01/14/2017   ALB 4.1 01/18/2016   BILT 0.4 01/18/2016   TP 7.3 01/18/2016   AST 19 01/18/2016   ALT 14 01/18/2016   T4F 0.75 03/27/2013   TSH 3.29 01/13/2017   CRP 0.7 02/16/2012   TROP 0.05* 01/11/2017                   Fco Waller MD  1/14/2017

## 2017-01-15 NOTE — PLAN OF CARE
Safety Risk - Non-Violent Restraints    • Patient will remain free from self-harm Completed    Moved pt close to nurses station.         PAIN - ADULT    • Verbalizes/displays adequate comfort level or patient's stated pain goal Progressing        Patient Ce

## 2017-01-15 NOTE — PROGRESS NOTES
[facility]    Progress Note    Thereasa Tajik Patient Status:  Inpatient    10/6/1926 MRN [id number]   Location [facility] 3W/SW Attending Betsy Aponte MD   Hosp Day # 4 PCP Guy Qiu MD         Assessment and Plan:   C 01/15/2017    01/15/2017   K 4.1 01/15/2017    01/15/2017   CO2 29 01/15/2017   * 01/15/2017   CA 8.5 01/15/2017   ALB 4.1 01/18/2016   BILT 0.4 01/18/2016   TP 7.3 01/18/2016   AST 19 01/18/2016   ALT 14 01/18/2016   T4F 0.75 03/27/2013

## 2017-01-15 NOTE — PLAN OF CARE
Problem: SAFETY ADULT - FALL  Goal: Free from fall injury  INTERVENTIONS:  - Assess pt frequently for physical needs  - Identify cognitive and physical deficits and behaviors that affect risk of falls.   - [place] fall precautions as indicated by assessme

## 2017-01-15 NOTE — PROGRESS NOTES
[facility]    Progress Note    Maria C Hidalgo Patient Status:  Inpatient    10/6/1926 MRN [id number]   Location [facility] 3W/SW Attending Madaline Seip, MD   Hosp Day # 4 PCP Williams Bishop MD     Subjective: she feel ok, l Could be secondary to dementia, she look much better today, ct head negative ,    Results:     Lab Results  Component Value Date   WBC 7.9 01/11/2017   HGB 12.2 01/11/2017   HCT 36.5 01/11/2017    01/11/2017   CREATSERUM 0.83 01/14/2017   BUN 27* 01

## 2017-01-16 NOTE — PLAN OF CARE
PAIN - ADULT    • Verbalizes/displays adequate comfort level or patient's stated pain goal Progressing        Patient Centered Care    • Patient preferences are identified and integrated in the patient's plan of care [address]

## 2017-01-16 NOTE — PROGRESS NOTES
[facility]    Progress Note    Blair Antonio Patient Status:  Inpatient    10/6/1926 MRN [id number]   Location [facility] 3W/SW Attending Chelsi Yap MD   Hosp Day # 5 PCP Christopher Covington MD     Subjective:     Respirator 4.1 01/18/2016   BILT 0.4 01/18/2016   TP 7.3 01/18/2016   AST 19 01/18/2016   ALT 14 01/18/2016   T4F 0.75 03/27/2013   TSH 3.29 01/13/2017   CRP 0.7 02/16/2012   TROP 0.05* 01/11/2017       Ct Brain Or Head (56126)    1/14/2017  CONCLUSION:  1.  No acute

## 2017-01-16 NOTE — PHYSICAL THERAPY NOTE
PHYSICAL THERAPY TREATMENT NOTE - INPATIENT    Room Number: 332/332-A       Presenting Problem: SOB  fatigue  CHF exacerbation  declining safety and mobility in the home     Problem List  Principal Problem:    Congestive heart failure with cardiomyopathy Climbing 3-5 steps with a railing?: A Lot    AM-PAC Score:  Raw Score: 17   PT Approx Degree of Impairment Score: 50.57%   Standardized Score (AM-PAC Scale): 42.13   CMS Modifier (G-Code): CK    FUNCTIONAL ABILITY STATUS  Gait Assessment   Gait Assistanc

## 2017-01-16 NOTE — PROGRESS NOTES
Pt daughter called. She spoke with her family and they have consented to angiogram.  Pt will be kept NPO after midnight. Betasept wash initiated. Telephone consent in chart.

## 2017-01-17 NOTE — BRIEF PROCEDURE NOTE
[facility]    Brief Cardiac Cath Note  Wilad Summers Patient Status:  Inpatient    10/6/1926 MRN [id number]   Location [facility] Attending Leslie Hernandez MD   Hosp Day # 5 PCP Smitha Adams MD       Ca

## 2017-01-17 NOTE — PROGRESS NOTES
[facility]    Progress Note    Funmi Milton Patient Status:  Inpatient    10/6/1926 MRN [id number]   Location [facility] 3W/SW Attending Vi Saez MD   Hosp Day # 6 PCP Royer Wright MD     Subjective:     Respirator TROP 0.05* 01/11/2017           Ekg 12-lead    1/17/2017  ECG Report  Interpretation  -------------------------- Normal sinus rhythm with PAC's. Voltage criteria for LVH met.   -ST depression + T-abnormality -Secondary to hypertrophy -possible Anterior/la

## 2017-01-17 NOTE — PHYSICAL THERAPY NOTE
PHYSICAL THERAPY TREATMENT NOTE - INPATIENT    Room Number: 332/332-A       Presenting Problem: SOB  fatigue  CHF exacerbation  declining safety and mobility in the home     Problem List  Principal Problem:    Congestive heart failure with cardiomyopathy SHORT FORM - BASIC MOBILITY  How much difficulty does the patient currently have. ..  -   Turning over in bed (including adjusting bedclothes, sheets and blankets)?: A Little   -   Sitting down on and standing up from a chair with arms (e.g., wheelchair, be supervision      Goal #4  If to home with 24hr assist of family   Assess stairs up down  4 steps with rail   CGA     Goal #5      Goal #6      Goal Comments: Goals established on 1/13/2017

## 2017-01-17 NOTE — DISCHARGE PLANNING
1/17/17 CM Discharge planning   Spoke with dtr [facility] via phone [phone], discussed discharge planning and short term rehab options.   Dtr agreed to rehab at [facility] where pt has been in the past.  Referral and medical records faxed to [facility]

## 2017-01-17 NOTE — PROGRESS NOTES
[facility]    Progress Note    Alfredo Kinds Patient Status:  Inpatient    10/6/1926 MRN [id number]   Location [facility] 3W/SW Attending Mundo Bashir MD   Hosp Day # 6 PCP Jayme Lopez MD         Assessment and Plan:   C CO2 28 01/17/2017   * 01/17/2017   CA 9.0 01/17/2017   ALB 4.1 01/18/2016   BILT 0.4 01/18/2016   TP 7.3 01/18/2016   AST 19 01/18/2016   ALT 14 01/18/2016   T4F 0.75 03/27/2013   TSH 3.29 01/13/2017   CRP 0.7 02/16/2012   TROP 0.05* 01/11/2017

## 2017-01-18 PROBLEM — E43 SEVERE MALNUTRITION (HCC): Chronic | Status: ACTIVE | Noted: 2017-01-01

## 2017-01-18 NOTE — PROGRESS NOTES
[facility]  Progress Note    Wilda Jacquelineabhijeet Patient Status:  Inpatient    10/6/1926 MRN [id number]   Location [facility] 3W/SW Attending Leslie Hernandez MD   Hosp Day # 7 PCP MD Carlos Adan Ekg 12-lead    1/17/2017  ECG Report  Interpretation  -------------------------- Normal sinus rhythm with PAC's. Voltage criteria for LVH met. -ST depression + T-abnormality -Secondary to hypertrophy -possible Anterior/lateral ischemia.  ABNORMAL

## 2017-01-18 NOTE — PROGRESS NOTES
[facility]    Progress Note    Cao Basket Patient Status:  Inpatient    10/6/1926 MRN [id number]   Location [facility] 3W/SW Attending Mando Munoz MD   Hosp Day # 7 PCP Luis Acosta MD     Subjective:     Constituti 01/17/2017   CA 9.0 01/17/2017   ALB 4.1 01/18/2016   BILT 0.4 01/18/2016   TP 7.3 01/18/2016   AST 19 01/18/2016   ALT 14 01/18/2016   T4F 0.75 03/27/2013   TSH 3.29 01/13/2017   CRP 0.7 02/16/2012   TROP 0.05* 01/11/2017           Ekg 12-lead    1/17/201

## 2017-01-18 NOTE — DISCHARGE SUMMARY
[facility]  Discharge Summary    Marlin Honeycutt Patient Status:  Inpatient    10/6/1926 MRN [id number]   Location [facility] 3W/SW Attending Jenna Falcon MD   [facility] Day # 7 PCP Eugenie Garcia MD     Date of Admission: 2017    Date daughter. They would like to proceed with this. Pt had cardiac cath done. Her CHF improved with diuresis. She will be d/c to [facility] in stable condition, and will f/u with Cardio next week. They will discuss further treatment options at that time.     Co

## 2017-01-18 NOTE — PLAN OF CARE
PAIN - ADULT    • Verbalizes/displays adequate comfort level or patient's stated pain goal Adequate for Discharge        Patient Centered Care    • Patient preferences are identified and integrated in the patient's plan of care Adequate for Discharge

## 2017-01-18 NOTE — PLAN OF CARE
Pt slipped out of chair and fell onto floor this afternoon. She hit her head on side of bed. Pt had no signs of injury. Dr. Ange Weiss notified.

## 2017-01-25 NOTE — CONSULTS
[facility]    PATIENT'S NAME: Tammy Aguero   ATTENDING PHYSICIAN: Michelle Damico MD   CONSULTING PHYSICIAN: Mykel Stone.  IMMANUEL Juarez   PATIENT ACCOUNT#:   [de-identified]    LOCATION:  [address] [place] [address] #:   [id number]       DATE OF [facility] hypotension prior to discharge. These may be considered as an outpatient. Dictated By IMMANUEL Santillan  d: 01/25/2017 08:12:37  t: 01/25/2017 08:15:04  [facility] [id number]  EFM/

## 2017-01-25 NOTE — PROGRESS NOTES
[facility]        Flavia Vences is a 80year old female who presents to clinic for APN assessment of acute on chronic systolic heart failure.      The patient presented to the ED (1/11/17) with dyspnea on exertion occurring over a few weeks p 125/72 mmHg  Pulse 91  Resp 18  Wt 92 lb (41.731 kg)  SpO2 91%    Clinical weights: 1) 92    General appearance: alert, appears stated age and cooperative  Neck: supple, symmetrical, trachea midline  Lungs: diminished breath sounds bilaterally  Heart: regu medications  Continue tracking daily weights. Call with weight gain 2-3 lbs over night or concerning symptoms. [phone]  32-64 oz fluid restriction  2000 mg sodium diet  Family and/or [address] to coordinate follow up appointment.  Recommend close foll

## 2017-01-25 NOTE — PATIENT INSTRUCTIONS
Continue current medications  Continue tracking daily weights. Call with weight gain 2-3 lbs over night or concerning symptoms. [phone]  32-64 oz fluid restriction  2000 mg sodium diet  Family and/or [facility] to coordinate follow up appointment.

## 2017-04-01 NOTE — ED PROVIDER NOTES
Patient Seen in: [facility] AND [facility] Emergency Department    History   Patient presents with:  Fall (musculoskeletal, neurologic)    Stated Complaint: left wrist pain/deformity s/p fall    HPI    26-year-old female tripped and fell landing on her outstretc Onset   • Diabetes Mother          Smoking Status: Never Smoker                      Smokeless Status: Never Used                        Alcohol Use: No                Review of Systems    Positive for stated complaint: left wrist pain/deformity s/p fall deficit. Skin: Skin is warm and dry. Psychiatric: She has a normal mood and affect. Her behavior is normal.   Nursing note and vitals reviewed. ED Course   Labs Reviewed - No data to display    MDM   xrays revewed and discussed with vision.  L

## 2017-04-01 NOTE — ED NOTES
Pt presents to the ED for the c/o left wrist swelling, right thigh pain and pelvic pain s/p unwitnessed fall at the NH. Pt denies loc. +cms in all extremities.

## 2017-04-03 PROBLEM — S52.532A CLOSED COLLES' FRACTURE OF LEFT RADIUS, INITIAL ENCOUNTER: Status: ACTIVE | Noted: 2017-01-01

## 2017-04-20 PROBLEM — I50.9 ACUTE ON CHRONIC CONGESTIVE HEART FAILURE (HCC): Status: ACTIVE | Noted: 2017-01-01

## 2017-04-20 PROBLEM — I50.9 ACUTE ON CHRONIC CONGESTIVE HEART FAILURE, UNSPECIFIED CONGESTIVE HEART FAILURE TYPE: Status: ACTIVE | Noted: 2017-01-01

## 2017-04-20 NOTE — ED INITIAL ASSESSMENT (HPI)
From [facility]; pt was in physical therapy today moving around and became tachycardic and slightly short of breath. No complaints upon arrival, denies dizziness, SOB, CP. Pt is oriented X 1-2 at baseline. Hx CHF.  Staff at [facility] say pt needs to be ree

## 2017-04-20 NOTE — ED NOTES
Derrick Sanchez, , aware of pt status, aware [facility] may not be equipped to handle worsening CHF and will follow up upon admit

## 2017-04-20 NOTE — ED NOTES
Pt is DNR    Dr. Gabriela Sánchez will be following up to see if patient can be made hospice, per Dr. Chaney

## 2017-04-21 PROBLEM — Z71.89 GOALS OF CARE, COUNSELING/DISCUSSION: Status: ACTIVE | Noted: 2017-01-01

## 2017-04-21 PROBLEM — R06.00 DYSPNEA: Status: ACTIVE | Noted: 2017-01-01

## 2017-04-21 NOTE — CONSULTS
[facility] Patient Status:  Inpatient    10/6/1926 MRN [id number]   Location [facility] 3W/SW Attending Bijan Cervantes MD   Hosp Day # 1 PCP Gayle Shields MD     Date of Cons abdominal pain, n/v and unsure when she moved her bowels last. Pt is incontinent of urine at times per dtr. She has documented sacral and BLE wounds and is being followed by wound care. Patient recently has been at [facility] in the [place] apts.  She was mov Surgery   • Temporal arteritis [facility])    • Osteoporosis    • Injury of right knee      Per NG:artroscopic repair   • High blood pressure    • Valvular disease      Severe aortic stenosis         Past Surgical History          Comment x 5 C-sect results found for: PT, INR, PTT    Chemistry:  Lab Results  Component Value Date   CREATSERUM 1.02 04/21/2017   BUN 53* 04/21/2017    04/21/2017   K 4.7 04/21/2017   CL 98 04/21/2017   CO2 30 04/21/2017   * 04/21/2017   CA 8.1* 04/21/2017   [place] power of : Yes  Advance Directive: Patient/family asked to bring in     [address]: Yes  Healthcare Agent's Name: Dany Paniagua (son)  Healthcare Agent's Phone Number: [phone]  Pre-existing DNR/DNI Order: Yes, notify physician brief overview of the Medicare hospice benefit and philosophy. Renuka Avina is agreeable to meeting with [facility] hospice for informational session.  I told her I would have SW help with dc planning as well as this is undetermined at this point.  -SW-Referra

## 2017-04-21 NOTE — PLAN OF CARE
CARDIOVASCULAR - ADULT    • Maintains optimal cardiac output and hemodynamic stability Progressing    • Absence of cardiac arrhythmias or at baseline Progressing        CONFUSION    • Confusion, delirium, dementia or psychosis is improved or at baseline Pr

## 2017-04-21 NOTE — DISCHARGE PLANNING
4/21/17 CM Discharge planning    Pt currently is a resident at [facility], seen by Saima Magallanes.. Dtr Diane Flynn interested in palliative care at discharge. Referral made to [facility] for o/p Palliative care via [employer].

## 2017-04-21 NOTE — H&P
[facility]    History and Physical    Farooq Lombardi Patient Status:  Inpatient    10/6/1926 MRN [id number]   Location [facility] 3W/SW Attending Alex Caldwell MD   Hosp Day # 0 PCP Katie Grullon MD     Date:  2017  Melvi Abraham 500-200 MG-UNIT Oral Tab Take 1 tablet by mouth daily. potassium chloride 20 MEQ Oral Powd Pack Take 20 mEq by mouth daily. ondansetron 4 MG Oral Tablet Dispersible Take 4 mg by mouth every 6 (six) hours.      Metoprolol Succinate ER 25 MG Oral Tablet 2 04/20/2017   CO2 31 04/20/2017   * 04/20/2017   CA 8.1* 04/20/2017   ALB 3.4* 04/01/2017   ALKPHO 73 04/01/2017   BILT 1.6* 04/01/2017   TP 6.1 04/01/2017   AST 46* 04/01/2017   * 04/01/2017   T4F 0.75 03/27/2013   TSH 3.29 01/13/2017   CRP 0

## 2017-04-21 NOTE — DIETARY NOTE
ADULT NUTRITION INITIAL ASSESSMENT    Pt is at high nutrition risk. Pt meets malnutrition criteria.       CRITERIA FOR MALNUTRITION DIAGNOSIS:  Criteria for severe malnutrition diagnosis: chronic illness related to body fat severe depletion and related to PHYSICAL FINDINGS:  - Body Fat/Muscle Mass: severe depletion body fat triceps region per visual exam.    - Fluid Accumulation: lower extremity edema per visual exam  .  - Skin Integrity: breakdown.  - Kobe score 13    NUTRITION PRESCRIPTION:  Diet: Rickford Dew

## 2017-04-21 NOTE — PLAN OF CARE
Assist w/repositioning, keep skin clean & dry, being seen by wound rn, palliative care consult ordered. Cont. To monitor.     CARDIOVASCULAR - ADULT    • Maintains optimal cardiac output and hemodynamic stability Progressing    • Absence of cardiac arrhythm

## 2017-04-21 NOTE — PROGRESS NOTES
[facility]    Progress Note    Wilda Tamfaviola Patient Status:  Inpatient    10/6/1926 MRN [id number]   Location [facility] 3W/SW Attending Leslie Hernandez MD   Hosp Day # 1 PCP Smitha Adams MD     Subjective:     Respirator 46* 04/01/2017   * 04/01/2017   T4F 0.75 03/27/2013   TSH 3.29 01/13/2017   CRP 0.7 02/16/2012   TROP 0.05* 01/11/2017       Xr Chest Ap Portable  (cpt=71010)    4/20/2017  CONCLUSION:  1.  Mild-to-moderate cardiomegaly and marked pulmonary edema wit

## 2017-04-21 NOTE — CONSULTS
Patient is a 80year old female who was admitted to the hospital for Acute on chronic congestive heart failure, unspecified congestive heart failure type ([facility] [address]    80year old [place] female admitted to [address] [place] [address] from [facility] after being more short of br 7. Impressions: The right ventricular systolic pressure was increased     consistent with mild pulmonary hypertension. Compared to the previous study these findings represent indicate worsening.  the  gradient is unchanged accross the aortivc valve but the acetaminophen (TYLENOL) tab 650 mg 650 mg Oral Q6H PRN   PEG 3350 (MIRALAX) powder packet 17 g 17 g Oral Daily   ondansetron (ZOFRAN-ODT) disintegrating tab 4 mg 4 mg Oral Q6H PRN   Metoprolol Succinate ER (Toprol XL) 24 hr tab 12.5 mg 12.5 mg Oral Daily B calcitonin, salmon, (MIACALCIN) 200 UNIT/ACT Nasal Solution 1 spray by Nasal route daily. Allergies:  No Known Allergies    Review of Systems:   Patient is confused     Physical Exam:       General: No acute distress. HEENT: No scleral icterus.    Ne 80year old [place] female admitted to [address] [place] [address] from [facility] with increased shortness of breath, edema. Patient brought to ED CXR shows marked pulmonary edema with large bilateral pleural effusions, Labs showed elevated BNP.  Patient clinical picture consis

## 2017-04-21 NOTE — WOUND PROGRESS NOTE
WOUND CARE NOTE      PLAN   Recommendations:  May consider PV arterial Doppler with FLORENTIN to determine if compression therapy is appropriate  Turn schedules  Heels elevated using pillows, heel wedge or heel boots to offload heels  To prevent sliding: decre AST 46* 04/01/2017   * 04/01/2017

## 2017-04-22 PROBLEM — E87.5 HYPERKALEMIA: Status: ACTIVE | Noted: 2017-01-01

## 2017-04-22 PROBLEM — I50.23 ACUTE ON CHRONIC SYSTOLIC (CONGESTIVE) HEART FAILURE (HCC): Status: ACTIVE | Noted: 2017-01-01

## 2017-04-22 PROBLEM — N28.9 ACUTE RENAL INSUFFICIENCY: Status: ACTIVE | Noted: 2017-01-01

## 2017-04-22 NOTE — PLAN OF CARE
Dr. Noelle Rose 12 HOUR SHIFT. AND 4PM BMP. ORDERS TO PLACE DR. KAY ON CONSULT. DR. Dora Noriega SPOKE WITH HER VIA PHONE.  DR. Ambriz Headings.

## 2017-04-22 NOTE — PROGRESS NOTES
Patient seen in follow up. Patient denies any chest pain but sob. Patient has marked oliguria. Oswald flushed and bladder scan done. Renal ultrasound done.  Chart reviewed and discussed with Dr Lorenza Pena and RN.   04/22/17  1537   BP: 94/56   Pulse:    Tem bumetanide 1 MG Oral Tab Take 1 mg by mouth daily. docusate sodium 100 MG Oral Cap Take 100 mg by mouth daily. Calcium Carb-Cholecalciferol (OYSTER SHELL CALCIUM/VITAMIN D) 500-200 MG-UNIT Oral Tab Take 1 tablet by mouth daily.    potassium chloride 20 - Ostial LAD lesion 80%    - On asa, BB    - Conservative management    3. Severe aortic stenosis    - Conservative management    4. Severe ischemic cardiomyopathy EF 20-25%  5. Paroxysmal Atrial fibrillation    - Not seen on previous ECGs.  New?    - Rate

## 2017-04-22 NOTE — PLAN OF CARE
Pt with da silva catheter. 50ml of dark urine since pm shift. 50ml total unrine ouput shift 8am-4pm.    Dr. Glendy Mcclain and dr. Chastity Robles aware. Da Silva flushed and patent per orders and bladder scan obtained. md aware of results. No further orders at this time.  A

## 2017-04-22 NOTE — PROGRESS NOTES
[facility]    Progress Note    [facility] Patient Status:  Inpatient    10/6/1926 MRN [id number]   Location [facility] 3W/SW Attending Teresita Gomez MD   Hosp Day # 2 PCP Arleth Colorado MD     Subjective:     Respirator valve stenosis- medical management, pt not a surgery candidate     Palliative care was suggested by PCp ,                Results:     Lab Results  Component Value Date   WBC 9.5 04/20/2017   HGB 14.9 04/20/2017   HCT 46.2 04/20/2017    04/20/2017

## 2017-04-23 NOTE — DISCHARGE PLANNING
The pt. Is being transferred to [address] [place] [address] Unit in [facility] today 4/23 at 345p, via ambulance. The pt.'s family is aware and agreeable. [facility].  Julián [address], [address]

## 2017-04-23 NOTE — PROGRESS NOTES
Patient seen in follow up. No new complains. Denies cp or sob. Edematous. Chart reviewed.     04/23/17  0854   BP: 98/53   Pulse: 98   Temp: 97.2 °F (36.2 °C)   Resp: 20       Intake/Output Summary (Last 24 hours) at 04/23/17 1233  Last data filed at ALPRAZolam 0.25 MG Oral Tab Take 0.25 mg by mouth every 12 (twelve) hours as needed for Sleep. bumetanide 1 MG Oral Tab Take 1 mg by mouth daily. docusate sodium 100 MG Oral Cap Take 100 mg by mouth daily.    Calcium Carb-Cholecalciferol (OYSTER SHELL C - Conservative management    4. Severe ischemic cardiomyopathy EF 20-25%  5. Paroxysmal Atrial fibrillation    - Not seen on previous ECGs. New?    - Rate controlled    - On metoprolol    - On asa for thromboembolism protection    6. Dementia   7. SHYAM.

## 2017-04-23 NOTE — PROGRESS NOTES
[facility]    Progress Note    Jaimeeligia Schroeder Patient Status:  Inpatient    10/6/1926 MRN [id number]   Location [facility] 3W/SW Attending Daryl Walters MD   Hosp Day # 3 PCP Qasim Chapman MD     Subjective:     Respirator suggested by PCp ,           Results:     Lab Results  Component Value Date   WBC 10.8 04/23/2017   HGB 14.9 04/23/2017   HCT 46.5 04/23/2017    04/23/2017   CREATSERUM 2.08* 04/23/2017   BUN 81* 04/23/2017    04/23/2017   K 5.9* 04/23/2017

## 2017-04-23 NOTE — PLAN OF CARE
CARDIOVASCULAR - ADULT    • Maintains optimal cardiac output and hemodynamic stability Not Progressing    • Absence of cardiac arrhythmias or at baseline Not Progressing        CONFUSION    • Confusion, delirium, dementia or psychosis is improved or at bas

## 2017-04-23 NOTE — PLAN OF CARE
Marty Colin 38. HERE TO EVALUATE PATIENT AND [address]. ALEX NOTIFED RN THAT PT WOULD BE TAKEN TO [facility] [place] TODAY VIA AMBULANCE AT 345PM.  VERBAL ORDER WAS RECEIVED BY ALEX FROM DR. MENDEZ AND REPORT WAS GIV

## 2017-04-23 NOTE — CONSULTS
[facility]OSP - [facility]    Report of Consultation    Haley Holt Patient Status:  Inpatient    10/6/1926 MRN [id number]   Location [facility] 3W/SW Attending Estefani Napier MD   Hosp Day # 3 PCP Katharina Garcia MD     Date of Admission: disintegrating tab 4 mg 4 mg Oral Q6H PRN   Metoprolol Succinate ER (Toprol XL) 24 hr tab 12.5 mg 12.5 mg Oral Daily Beta Blocker   docusate sodium (COLACE) cap 100 mg 100 mg Oral Daily   cephALEXin (KEFLEX) cap 250 mg 250 mg Oral BID   aspirin EC tab 81 m (36.7 °C), temperature source Oral, resp. rate 18, weight 107 lb 9.6 oz (48.807 kg), SpO2 98 %, not currently breastfeeding.     Intake/Output Summary (Last 24 hours) at 04/23/17 0830  Last data filed at 04/23/17 0655   Gross per 24 hour   Intake   1102 ml with holding diuretics for now. GOC/ EOL planning   Palliative care note seen & reviewed. Agree with plan      D/w Dr Susan Laws   Thank you for allowing me to participate in the care of your patient.     Vneus Whaley  4/23/2017

## 2017-06-14 NOTE — DISCHARGE SUMMARY
Pt admitted on 04/20 and dc'd on 424/2017      Discharge diagnosis:     Acute on chronic systolic (congestive) heart failure [facility])- cardiology on board, pt was on lasix 40 tid, edema down a little , will change the  Lasix to daily for now due to acute wors

## 2022-11-03 NOTE — PLAN OF CARE
Problem: PAIN - ADULT  Goal: Verbalizes/displays adequate comfort level or patient’s stated pain goal  INTERVENTIONS:  - Encourage pt to monitor pain and request assistance  - Assess pain using appropriate pain scale  - Administer analgesics based on type 97.6

## 2023-11-14 NOTE — ED PROVIDER NOTES
Patient Seen in: [facility] 3w/sw    History   Patient presents with:  Arrythmia/Palpitations (cardiovascular)    Stated Complaint: Acute on chronic congestive heart failure, unspecified congestive heart failure*    HPI    Patient is a 61-year-old This 29 year old  female is seen today in followup of a crescentic IgA nephropathy.    Currently she is feeling fairly well.  No chest pains or shortness of breath.  No significant leg swelling.  She has not had visible hematuria. She has not been as good with the low sodium diet recently.    She has tolerated the mycophenolate well and is and continues on 1000 mg twice daily. Prednisone remains at 30 mg daily.    She is tolerating the bactrim as well.    Current Outpatient Medications   Medication Sig Dispense Refill   • mycophenolate (CELLCEPT) 500 MG tablet Take 2 tablets by mouth twice daily. 120 tablet 11   • predniSONE (DELTASONE) 10 MG tablet Take 3 tablets by mouth daily. 90 tablet 3   • sulfamethoxazole-trimethoprim (Bactrim) 400-80 MG per tablet Take 1 tablet by mouth 3 days a week. 12 tablet 11   • tiZANidine (ZANAFLEX) 4 MG tablet Take 1 tablet by mouth 3 times daily as needed (muscle spasm/pain, may make drowsy). 30 tablet 0   • lidocaine (LIDODERM) 5 % patch 1-2 patches for twelve hours, Remove patch 12 hours after applying 20 patch 0   • medroxyPROGESTERone (Provera) 10 MG tablet Take 1 tablet by mouth daily for 10 days. 10 tablet 0   • Prenatal Vit-Fe Fumarate-FA (PRENATAL VITAMIN PO) 2 gummies daily     • acetaminophen (TYLENOL) 325 MG tablet Take 325 mg by mouth every 4 hours as needed for Pain (as needed).       No current facility-administered medications for this visit.       Allergies as of 11/14/2023   • (No Known Allergies)       PHYSICAL EXAMINATION:  Visit Vitals  /76 (BP Location: LUE - Left upper extremity, Patient Position: Sitting, Cuff Size: Large Adult)   Pulse 72   Ht 5' 4\" (1.626 m)   Wt 76.1 kg (167 lb 12.8 oz)   LMP 10/18/2023 (Exact Date)   BMI 28.80 kg/m²     General: Awake, alert, oriented to person, and in no distress.   Chest:  Clear to auscultation, normal respiratory effort.   Heart:  Regular rate. No rub, murmur, S3   Extremities: No edema     LABS:  Lab Services on  every 6 (six) hours as needed for Nausea. Metoprolol Succinate ER 25 MG Oral Tablet 24 Hr,  Take 0.5 tablets (12.5 mg total) by mouth Daily Beta Blocker. aspirin 81 MG Oral Tab,  Take 1 tablet (81 mg total) by mouth daily.    acetaminophen 325 MG Oral heard.   Systolic murmur is present with a grade of 3/6   Pulmonary/Chest: Tachypnea noted. She is in respiratory distress. She has rales. Abdominal: Soft. She exhibits no distension. Musculoskeletal: Normal range of motion. She exhibits edema.    Neuro chronic congestive heart failure, unspecified congestive heart failure type ([facility] [address]  (primary encounter diagnosis)    Disposition:  Admit    Follow-up:  No follow-up provider specified.     Medications Prescribed:  Current Discharge Medication List        Pre

## (undated) NOTE — IP AVS SNAPSHOT
[address] [place] [address] [phone]                Discharge Summary   1/11/2017    [facility]           Admission Information        Provider Department    1/11/2017 Placido Womack MD [facility] 3w/ CHANGE how you take these medications        Instructions Authorizing Provider    Morning Afternoon Evening As Needed    ferrous sulfate 325 (65 FE) MG Tbec   What changed:  when to take this   Next dose due:  1/19 in the morning        Take 1 ta Why:  HF Follow up for BMP Check on 1/25/17 (Wednesday) at 12:30  [facility] / [facility] / Golden Correa / [address] [place] [address] information:    [address]  [address] [facility] [address]  270 [facility]  [address] - If you are a smoker or have smoked in the last 12 months, we encourage you to explore options for quitting.     - If you have concerns related to behavioral health issues or thoughts of harming yourself, contact 100 [facility] a with standing), heart rate changes, headaches, nausea/vomiting   What to report to your healthcare team:  Dizziness, nausea, chest pain, weakness, numbness           Water Pills     furosemide 20 MG Oral Tab       Use: Treat high blood pressure, swelling i

## (undated) NOTE — ED AVS SNAPSHOT
[facility] Emergency Department    [address]     [place] [address]    Phone:  [phone]    Fax:  [phone]           Levy Malcolm   MRN: [id number]    Department:  [facility] Emergency Department   Date of Visit:  3/ service to you, we would appreciate any positive or negative feedback related to the care you received in our emergency department. Please call our [address] at [phone]. Your Emergency Department team is here to serve you.   You are our top priori I certified that I have received a copy of the aftercare instructions; that these instructions have been explained to me; all questions pertaining to these instructions have been answered in a satisfactory manner.         [email or web address] Visit [email or web address] to learn more.

## (undated) NOTE — ED AVS SNAPSHOT
[facility] Emergency Department    [address]     [place] [address]    Phone:  [phone]    Fax:  [phone]           Flavia Vences   MRN: [id number]    Department:  [facility] Emergency Department   Date of Visit:  3/ and Class Registration line at [phone] or find a doctor online by visiting [email or web address].    IF THERE IS ANY CHANGE OR WORSENING OF YOUR CONDITION, CALL YOUR PRIMARY CARE PHYSICIAN AT ONCE OR RETURN IMMEDIATELY TO [address] [place] [address].     If

## (undated) NOTE — IP AVS SNAPSHOT
Patient Demographics     Address Phone E-mail Address    [address] [phone] [facility])  [phone] (Mobile) [email or web address]      Emergency Contact(s)     Name Relation Home Work Ependes Daughter [phone] Take 1 tablet (325 mg total) by mouth daily with breakfast.    Ching Cardoza                           furosemide 20 MG Tabs   Last time this was given:  20 mg on 1/18/2017 12:15 PM   Commonly known as:  LASIX   Next dose due:  Friday 1/20        Take SpO2 94 % Filed at 01/18/2017 0957      Patient's Most Recent Weight       Most Recent Value    Patient Weight 39.1 kg (86 lb 3.2 oz)      Lab Results Last 24 Hours     No matching results found      Microbiology Results (All)     None         H&P - H&P N lisinopril 10 MG Oral Tab Take 1 tablet (10 mg total) by mouth daily. atenolol 50 MG Oral Tab Take 1 tablet (50 mg total) by mouth once daily. ferrous sulfate 325 (65 FE) MG Oral Tab EC Take 325 mg by mouth 2 (two) times daily with meals.    Calcium Car Psychiatric: She has a normal mood and affect.  Her behavior is normal. Thought content normal.     Cervical Papanicolaou contraindicated    Results:     Lab Results  Component Value Date   WBC 7.9 01/11/2017   HGB 12.2 01/11/2017   HCT 36.5 01/11/2017   PL PATIENT ACCOUNT#:   [de-identified]    LOCATION:  [address] [place], [place] [address] RECORD #:   [id number]       YOB: 1926  ADMISSION DATE:       01/11/2017      CONSULT DATE:  01/12/2017    REPORT OF CONSULTATION    HISTORY OF PRESENT ILLNESS:  The patie NEUROLOGIC:  Alert and oriented with normal affect. LABORATORY DATA:  BUN is 41 and creatinine 1.0. Sodium is 135, potassium 4.9. BNP is 5388. Troponins are unremarkable at 0.06 and 0.05. White count is 7.9, hemoglobin 12.2, platelet count 244,019. Other closed fractures of upper end of humerus     Body mass index less than 19, adult     Consult & SX.10/4/14, CPT.24604, Closed intertrochanteric fracture of left femur ([facility] w/, Global.Exp.1/1/15     Sx.11/10/15, CPT.38473, Fracture of le Discharge Medications: Current Discharge Medication List    START taking these medications    Metoprolol Succinate ER 25 MG Oral Tablet 24 Hr  Take 0.5 tablets (12.5 mg total) by mouth Daily Beta Blocker.   Qty: 30 tablet Refills: 3    furosemide 20 MG Oral [facility], X CHEST PORTABLE, 11/10/2015, 12:51. INDICATIONS: Shortness of breath and weakness X 2 days. TECHNIQUE:   Two views. FINDINGS: CARDIAC/VASC: Cardiomegaly. Pulmonary venous congestion. Atherosclerotic calcification aorta.  MEDIAST/KATHERINE: No visi Kun Hernandez MD Sonographer: Holden Díaz  CC: Zain Elizondo MD  ------------------------------------------------------------------------------- Study Conclusions 1. Left ventricle: The cavity size was normal. Systolic function was severely    reduced.  The hypertrophy. Systolic function was severely reduced. The estimated ejection fraction was 20-25%. Wall motion was normal; there were no regional wall motion abnormalities. Aortic valve:   Doppler: There was critical stenosis. Mild regurgitation.    Valv ------- LVOT Area                                            3.14 cm^2     ------- Aorta Root diameter, ED                                 25 mm       ------- Left atrium Anterior-posterior dimension                      38 mm       ------- Anterior-poster ------- Systemic veins Estimated CVP                                      8 mm Hg    ------- Right ventricle RV pressure, S                                   *42 mm Hg    <30 Legend: Mean values are shown as u=mean value.  Asterisk (*) marks values outs level of function. Recommend continuation of skilled PT in a rehab setting so pt can maximize functional potential and achieve PLF.         DISCHARGE RECOMMENDATIONS  PT Discharge Recommendations: 24 hour care/supervision;Cont skilled therapy in a supervis Skilled Therapy Provided: Pt instructed in bed mobility, transfers and gait training without ad with emphasis on self pacing and energy conversation. Pt education for  high level balance activities and fall prevention strategies.     THERAPEUTIC EXERCISES assist without ad and would benefit from use of rw. Pt returned to bed with rn in room. DISCHARGE RECOMMENDATIONS  PT Discharge Recommendations: Cont skilled therapy in a supervised setting     PLAN  PT Treatment Plan: Bed mobility; Energy conservation; Comment : pt reports amb without AD on admission    pt reports  she thinks she may have had some falls   amb attempted without AD  note pt reaching for furniture in room to steady self as needed  CGA to mi n therapy assist for safety      therefore  amb tr

## (undated) NOTE — MR AVS SNAPSHOT
[address]al[address] [place] [address]  Yarelis Plunkett [phone]  [phone]               Thank you for choosing us for your health care visit with Ofelia Nunez NP.   We are glad to serve you and happy to provide Take 1 tablet (325 mg total) by mouth daily with breakfast.           furosemide 20 MG Tabs   Take 1 tablet (20 mg total) by mouth 3 (three) times a week.    What changed:  when to take this   Commonly known as:  LASIX           Metoprolol Succinate ER 25 M Get your heart pumping – brisk walking, biking, swimming Even 10 minute increments are effective and add up over the week   2 ½ hours per week – spread out over time Use a ashkan to keep you motivated   Don’t forget strength training with weights and resist